# Patient Record
Sex: MALE | Race: WHITE
[De-identification: names, ages, dates, MRNs, and addresses within clinical notes are randomized per-mention and may not be internally consistent; named-entity substitution may affect disease eponyms.]

---

## 2019-02-09 ENCOUNTER — HOSPITAL ENCOUNTER (EMERGENCY)
Dept: HOSPITAL 92 - ERS | Age: 34
Discharge: HOME | End: 2019-02-09
Payer: SELF-PAY

## 2019-02-09 DIAGNOSIS — F17.210: ICD-10-CM

## 2019-02-09 DIAGNOSIS — J20.9: Primary | ICD-10-CM

## 2019-02-09 PROCEDURE — 94640 AIRWAY INHALATION TREATMENT: CPT

## 2019-02-09 PROCEDURE — 71046 X-RAY EXAM CHEST 2 VIEWS: CPT

## 2019-02-09 NOTE — RAD
CHEST TWO VIEWS:

2/9/19

 

COMPARISON:  

None. 

 

HISTORY: 

Shortness of breath for three to four days and runny nose. Cough. 

 

FINDINGS:

Two views of the chest show normal sized cardiomediastinal silhouette. There is no evidence of consol
idation, mass, or pleural effusion. The bones are unremarkable.

 

IMPRESSION:

No evidence of acute cardiopulmonary disease.

 

POS: [facility]

## 2020-02-09 ENCOUNTER — HOSPITAL ENCOUNTER (EMERGENCY)
Dept: HOSPITAL 9 - MADERS | Age: 35
Discharge: HOME | End: 2020-02-09
Payer: SELF-PAY

## 2020-02-09 DIAGNOSIS — F17.210: ICD-10-CM

## 2020-02-09 DIAGNOSIS — R59.0: ICD-10-CM

## 2020-02-09 DIAGNOSIS — J10.1: Primary | ICD-10-CM

## 2020-02-09 PROCEDURE — 99283 EMERGENCY DEPT VISIT LOW MDM: CPT

## 2020-02-09 PROCEDURE — 87804 INFLUENZA ASSAY W/OPTIC: CPT
